# Patient Record
Sex: FEMALE | Race: WHITE | NOT HISPANIC OR LATINO
[De-identification: names, ages, dates, MRNs, and addresses within clinical notes are randomized per-mention and may not be internally consistent; named-entity substitution may affect disease eponyms.]

---

## 2017-01-19 DIAGNOSIS — Z00.00 ENCOUNTER FOR GENERAL ADULT MEDICAL EXAMINATION W/OUT ABNORMAL FINDINGS: ICD-10-CM

## 2017-05-22 ENCOUNTER — APPOINTMENT (OUTPATIENT)
Dept: SURGERY | Facility: CLINIC | Age: 52
End: 2017-05-22

## 2017-09-25 ENCOUNTER — RESULT REVIEW (OUTPATIENT)
Age: 52
End: 2017-09-25

## 2024-03-06 PROBLEM — Z13.29 SCREENING FOR ENDOCRINE, NUTRITIONAL, METABOLIC AND IMMUNITY DISORDER: Status: ACTIVE | Noted: 2024-03-06

## 2024-03-14 ENCOUNTER — NON-APPOINTMENT (OUTPATIENT)
Age: 59
End: 2024-03-14

## 2024-03-14 ENCOUNTER — APPOINTMENT (OUTPATIENT)
Dept: SURGERY | Facility: CLINIC | Age: 59
End: 2024-03-14
Payer: COMMERCIAL

## 2024-03-14 VITALS
RESPIRATION RATE: 17 BRPM | HEART RATE: 69 BPM | SYSTOLIC BLOOD PRESSURE: 127 MMHG | DIASTOLIC BLOOD PRESSURE: 77 MMHG | HEIGHT: 62 IN | WEIGHT: 192 LBS | TEMPERATURE: 97.6 F | BODY MASS INDEX: 35.33 KG/M2 | OXYGEN SATURATION: 97 %

## 2024-03-14 DIAGNOSIS — Z13.21 ENCOUNTER FOR SCREENING FOR OTHER SUSPECTED ENDOCRINE DISORDER: ICD-10-CM

## 2024-03-14 DIAGNOSIS — Z13.29 ENCOUNTER FOR SCREENING FOR OTHER SUSPECTED ENDOCRINE DISORDER: ICD-10-CM

## 2024-03-14 DIAGNOSIS — Z13.228 ENCOUNTER FOR SCREENING FOR OTHER SUSPECTED ENDOCRINE DISORDER: ICD-10-CM

## 2024-03-14 DIAGNOSIS — Z13.0 ENCOUNTER FOR SCREENING FOR OTHER SUSPECTED ENDOCRINE DISORDER: ICD-10-CM

## 2024-03-14 DIAGNOSIS — R63.5 ABNORMAL WEIGHT GAIN: ICD-10-CM

## 2024-03-14 DIAGNOSIS — E66.01 MORBID (SEVERE) OBESITY DUE TO EXCESS CALORIES: ICD-10-CM

## 2024-03-14 PROCEDURE — 99203 OFFICE O/P NEW LOW 30 MIN: CPT

## 2024-03-14 RX ORDER — ESCITALOPRAM OXALATE 10 MG/1
10 TABLET, FILM COATED ORAL
Refills: 0 | Status: ACTIVE | COMMUNITY

## 2024-03-14 NOTE — ASSESSMENT
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

## 2024-03-14 NOTE — PLAN
[FreeTextEntry1] : Plan: Resume high-protein low-carb diet; 600 kcal 60 to 80 g of protein a day less than 25 g of carbohydrates per day. Dietary counseling provided Consulted with practice NP regarding possible weight loss medication Increase activities as tolerated Check blood work; prescription given vitamins daily Follow-up 3 months

## 2024-03-14 NOTE — HISTORY OF PRESENT ILLNESS
[de-identified] : Patient wants has gained weight since last visit - gets hungry often. Highest weight is 278 lbs (before sleeve sx). Lowest weight was around 154 lbs. Has tried to lose weight through diet and walking/trend mill. Is currently the heaviest shes been since after sx. Sometimes has acid reflux - takes Nexium PRN. Denies shortness of breath with exertion, sleep apnea, weight bearing joint pain, or lower back pain. Denies hypertension, hypercholesteremia, and diabetes. Not taking anticoagulants. [facility] abdominal sx: sleeve, appendectomy, and .  [de-identified] : S/p gastric sleeve in 2014, lost follow up. Patient here to reestablish care due to weight gain

## 2024-03-22 LAB
25(OH)D3 SERPL-MCNC: 24 NG/ML
ALBUMIN SERPL ELPH-MCNC: 4.3 G/DL
ALP BLD-CCNC: 80 U/L
ALT SERPL-CCNC: 14 U/L
AMYLASE/CREAT SERPL: 59 U/L
ANION GAP SERPL CALC-SCNC: 14 MMOL/L
AST SERPL-CCNC: 18 U/L
BASOPHILS # BLD AUTO: 0.03 K/UL
BASOPHILS NFR BLD AUTO: 0.5 %
BILIRUB SERPL-MCNC: 0.3 MG/DL
BUN SERPL-MCNC: 13 MG/DL
CALCIUM SERPL-MCNC: 9.3 MG/DL
CHLORIDE SERPL-SCNC: 101 MMOL/L
CHOLEST SERPL-MCNC: 187 MG/DL
CO2 SERPL-SCNC: 23 MMOL/L
CREAT SERPL-MCNC: 0.63 MG/DL
EGFR: 102 ML/MIN/1.73M2
EOSINOPHIL # BLD AUTO: 0.02 K/UL
EOSINOPHIL NFR BLD AUTO: 0.3 %
ESTIMATED AVERAGE GLUCOSE: 117 MG/DL
FOLATE SERPL-MCNC: 11.7 NG/ML
GLUCOSE SERPL-MCNC: 84 MG/DL
HBA1C MFR BLD HPLC: 5.7 %
HCT VFR BLD CALC: 36.5 %
HDLC SERPL-MCNC: 41 MG/DL
HGB BLD-MCNC: 11.9 G/DL
IMM GRANULOCYTES NFR BLD AUTO: 0.2 %
LDLC SERPL CALC-MCNC: 127 MG/DL
LPL SERPL-CCNC: 24 U/L
LYMPHOCYTES # BLD AUTO: 0.92 K/UL
LYMPHOCYTES NFR BLD AUTO: 16.1 %
MAGNESIUM SERPL-MCNC: 1.9 MG/DL
MAN DIFF?: NORMAL
MCHC RBC-ENTMCNC: 28.1 PG
MCHC RBC-ENTMCNC: 32.6 GM/DL
MCV RBC AUTO: 86.3 FL
MONOCYTES # BLD AUTO: 0.47 K/UL
MONOCYTES NFR BLD AUTO: 8.2 %
NEUTROPHILS # BLD AUTO: 4.28 K/UL
NEUTROPHILS NFR BLD AUTO: 74.7 %
NONHDLC SERPL-MCNC: 147 MG/DL
PHOSPHATE SERPL-MCNC: 4.3 MG/DL
PLATELET # BLD AUTO: 387 K/UL
POTASSIUM SERPL-SCNC: 3.8 MMOL/L
PROT SERPL-MCNC: 6.8 G/DL
RBC # BLD: 4.23 M/UL
RBC # FLD: 14.3 %
SODIUM SERPL-SCNC: 138 MMOL/L
TRIGL SERPL-MCNC: 106 MG/DL
TSH SERPL-ACNC: 2.41 UIU/ML
VIT B12 SERPL-MCNC: 341 PG/ML
WBC # FLD AUTO: 5.73 K/UL

## 2024-03-26 ENCOUNTER — APPOINTMENT (OUTPATIENT)
Dept: SURGERY | Facility: CLINIC | Age: 59
End: 2024-03-26
Payer: COMMERCIAL

## 2024-03-26 VITALS — BODY MASS INDEX: 35.33 KG/M2 | HEIGHT: 62 IN | WEIGHT: 192 LBS

## 2024-03-26 PROCEDURE — 99204 OFFICE O/P NEW MOD 45 MIN: CPT

## 2024-03-26 NOTE — PHYSICAL EXAM
[Obese] : obese [Normal] : grossly intact [de-identified] : Current BMI 35.1.  Limited due to telehealth visit.

## 2024-03-26 NOTE — ASSESSMENT
[FreeTextEntry1] : Patient is status post sleeve gastrectomy in 2014 with 120 pound weight loss.  She has regained 40 pounds over the last several years.  This weight gain has been refractory to diet and exercise efforts.  I will prescribe patient 37.5 mg of phentermine.  She will start by taking 1/2 tablet daily.  We discussed side effects risks and benefits.  Patient wishes to proceed.  I will see her in 2 months for follow-up.  I have discussed with the patient options for medical weight management and will initiate phentermine.  I have discussed possible side effects including but not limited to palpitations, insomnia, nervousness, increased anxiety, elevated blood pressure, dry mouth, and constipation. Pt. verbalizes understanding and would like to continue with planned treatment. Jessica does not have any cardiac conditions to phentermine such as valve replacements or uncontrolled hypertension, previous cardia stents or myocardial infarction.

## 2024-03-26 NOTE — HISTORY OF PRESENT ILLNESS
[Home] : at home, [unfilled] , at the time of the visit. [Medical Office: ([facility])___] : at the medical office located in  [Verbal consent obtained from patient] : the patient, [unfilled] [de-identified] : Patient has gained weight since last visit - gets hungry often. Highest weight is 278 lbs (before sleeve sx). Lowest weight was around 154 lbs. Has tried to lose weight through diet and walking/trend mill. Is currently the heaviest shes been since after sx. Sometimes has acid reflux - takes Nexium PRN. Denies shortness of breath with exertion, sleep apnea, weight bearing joint pain, or lower back pain. Denies hypertension, hypercholesteremia, and diabetes. Not taking anticoagulants. [facility] abdominal sx: sleeve, appendectomy, and .   Procedure Details: Procedure performed: Laparoscopic sleeve gastrectomy , Date of Surgery: 2014, Surgeon Name: Dr. Avery . Pre-op weight was 236 lbs.   Post Operative: 10 years. S/p gastric sleeve in , lost follow up. Patient here to reestablish care due to weight gain. [de-identified] : Patient is status post sleeve gastrectomy in 2014.  Initial weight 270 pounds and lowest weight 152 pounds.  Her current weight is 192 pounds.  She reports gaining this 40 pounds over the last several years slowly.  She feels as if her appetite has significantly increased as has her portion sizes.  We discussed several different obesity medicine options and patient agrees at the current time due to shortages of all GLP-1 injections we will try low-dose phentermine.  We discussed side effects, risks and benefits of phentermine.  Patient wishes to proceed with plan.  She denies any history of hypertension, stents or cardiac history.  Patient has a home blood pressure monitor and will monitor her blood pressure daily and report any readings above 130/90.  Patient verbalized understanding.  I will prescribe 37.5 mg and have patient break tablet in half for the first 2 weeks.  If patient needs a higher dose she will increase to the full 37.5 mg as tolerated.

## 2024-03-27 LAB — VIT B1 SERPL-MCNC: 99.7 NMOL/L

## 2024-05-28 ENCOUNTER — APPOINTMENT (OUTPATIENT)
Dept: SURGERY | Facility: CLINIC | Age: 59
End: 2024-05-28
Payer: COMMERCIAL

## 2024-05-28 VITALS — HEIGHT: 62 IN | BODY MASS INDEX: 31.83 KG/M2 | WEIGHT: 173 LBS

## 2024-05-28 PROCEDURE — 99213 OFFICE O/P EST LOW 20 MIN: CPT

## 2024-05-28 RX ORDER — PHENTERMINE HYDROCHLORIDE 37.5 MG/1
37.5 TABLET ORAL
Qty: 30 | Refills: 1 | Status: ACTIVE | COMMUNITY
Start: 2024-03-26 | End: 1900-01-01

## 2024-05-28 NOTE — PHYSICAL EXAM
[Obese] : obese [Normal] : affect appropriate [de-identified] : Current BMI 31.6.  Limited due to telehealth visit.

## 2024-05-28 NOTE — HISTORY OF PRESENT ILLNESS
[Home] : at home, [unfilled] , at the time of the visit. [Medical Office: ([facility])___] : at the medical office located in  [Verbal consent obtained from patient] : the patient, [unfilled] [de-identified] : Procedure Details: Procedure performed: Laparoscopic sleeve gastrectomy , Date of Surgery: 1/22/2014, Surgeon Name: Dr. Avery. Pre-op weight was 236 lbs. Post Operative: 10 years. S/p gastric sleeve in 2014, lost follow up. Patient here to reestablish care due to weight gain.   Interval History: Patient is status post sleeve gastrectomy in 2014. Initial weight 270 pounds and lowest weight 152 pounds. Her current weight is 192 pounds. She reports gaining this 40 pounds over the last several years slowly. She feels as if her appetite has significantly increased as has her portion sizes.  Was prescribed phentermine at previous visit and presents for f/u today.   [de-identified] : Patient is down almost 20 pounds since last being seen 2 months ago while starting phentermine.  Patient currently tolerating 37.5 mg with no reported adverse effects.  Denies hypertension, tachycardia insomnia or constipation.  Is very happy with her results.  Also reports an increase in energy.  She has used his energy to increase her physical activity and I suggested at least 150 minutes/week of exercise.  Patient reports significant decrease in appetite and is working on a caloric reduction to no more than 1200 to 1300 laquita/day.  Will refill phentermine for 2 more months and I will see her for a follow-up then.  I instructed patient to contact me if she has any questions or concerns.  I also mention to patient that she may develop decrease in weight loss as she develops resistance to this medication over time.  Patient verbalized understanding of this.

## 2024-05-28 NOTE — ASSESSMENT
[FreeTextEntry1] : In summary Jessica is a pleasant 58-year-old female that presents for obesity medicine follow-up today.  She was prescribed phentermine 2 months ago at initial visit and doing quite well.  She has lost 20 pounds and happy with her results.  She has reported increasing her activities and noticeably having a decrease in appetite and cravings.  Continues to work on caloric reduction as discussed in today's visit.  I will see her in 2 months for follow-up.  Patient agrees with current plan.

## 2024-06-13 ENCOUNTER — APPOINTMENT (OUTPATIENT)
Dept: SURGERY | Facility: CLINIC | Age: 59
End: 2024-06-13

## 2024-07-22 ENCOUNTER — APPOINTMENT (OUTPATIENT)
Dept: ORTHOPEDIC SURGERY | Facility: CLINIC | Age: 59
End: 2024-07-22
Payer: COMMERCIAL

## 2024-07-22 DIAGNOSIS — M65.9 SYNOVITIS AND TENOSYNOVITIS, UNSPECIFIED: ICD-10-CM

## 2024-07-22 PROCEDURE — 99203 OFFICE O/P NEW LOW 30 MIN: CPT

## 2024-07-22 NOTE — PHYSICAL EXAM
[Mild] : mild diffused ankle swelling [NL (40)] : plantar flexion 40 degrees [NL 30)] : inversion 30 degrees [5___] : eversion 5[unfilled]/5 [2+] : posterior tibialis pulse: 2+ [Normal] : saphenous nerve sensation normal [Left] : left ankle [Weight -] : weightbearing [] : non-antalgic [FreeTextEntry9] : Plantar heel spur, lucency medial talar dome and distal tibia.  [TWNoteComboBox7] : dorsiflexion 15 degrees [de-identified] : eversion 15 degrees

## 2024-07-22 NOTE — PHYSICAL EXAM
[Mild] : mild diffused ankle swelling [NL (40)] : plantar flexion 40 degrees [NL 30)] : inversion 30 degrees [5___] : eversion 5[unfilled]/5 [2+] : posterior tibialis pulse: 2+ [Normal] : saphenous nerve sensation normal [Left] : left ankle [Weight -] : weightbearing [] : non-antalgic [FreeTextEntry9] : Plantar heel spur, lucency medial talar dome and distal tibia.  [TWNoteComboBox7] : dorsiflexion 15 degrees [de-identified] : eversion 15 degrees

## 2024-07-22 NOTE — DISCUSSION/SUMMARY
[de-identified] : Due to the chronicity and xray findings, recommend MRI LT ankle to evaluate for medial talar osteochondral injury.  WB in supportive footwear. Tylenol prn (history gastric sleeve). Ice to affected area. Avoid high impact activities.

## 2024-07-22 NOTE — PHYSICAL EXAM
[Mild] : mild diffused ankle swelling [NL (40)] : plantar flexion 40 degrees [NL 30)] : inversion 30 degrees [5___] : eversion 5[unfilled]/5 [2+] : posterior tibialis pulse: 2+ [Normal] : saphenous nerve sensation normal [Left] : left ankle [Weight -] : weightbearing [] : non-antalgic [FreeTextEntry9] : Plantar heel spur, lucency medial talar dome and distal tibia.  [TWNoteComboBox7] : dorsiflexion 15 degrees [de-identified] : eversion 15 degrees

## 2024-07-22 NOTE — DISCUSSION/SUMMARY
[de-identified] : Due to the chronicity and xray findings, recommend MRI LT ankle to evaluate for medial talar osteochondral injury.  WB in supportive footwear. Tylenol prn (history gastric sleeve). Ice to affected area. Avoid high impact activities.

## 2024-07-22 NOTE — DISCUSSION/SUMMARY
[de-identified] : Due to the chronicity and xray findings, recommend MRI LT ankle to evaluate for medial talar osteochondral injury.  WB in supportive footwear. Tylenol prn (history gastric sleeve). Ice to affected area. Avoid high impact activities.

## 2024-07-25 NOTE — HISTORY OF PRESENT ILLNESS
[de-identified] : NATALIYA DONATO a 58 year old female here for evaluation of her. Patient states she Pain localized along the lateral medial dorsal plantar Had XR taken which showed Denies trauma/previous injury. No numbness/tingling. Ice to affected area. No formal treatment to date. WB in

## 2024-07-25 NOTE — HISTORY OF PRESENT ILLNESS
[de-identified] : NATALIYA DONATO a 58 year old female here for evaluation of her. Patient states she Pain localized along the lateral medial dorsal plantar Had XR taken which showed Denies trauma/previous injury. No numbness/tingling. Ice to affected area. No formal treatment to date. WB in

## 2024-07-25 NOTE — HISTORY OF PRESENT ILLNESS
[de-identified] : NATALIYA DONATO a 58 year old female here for evaluation of her. Patient states she Pain localized along the lateral medial dorsal plantar Had XR taken which showed Denies trauma/previous injury. No numbness/tingling. Ice to affected area. No formal treatment to date. WB in

## 2024-07-31 ENCOUNTER — APPOINTMENT (OUTPATIENT)
Dept: MRI IMAGING | Facility: CLINIC | Age: 59
End: 2024-07-31
Payer: COMMERCIAL

## 2024-07-31 PROCEDURE — 73721 MRI JNT OF LWR EXTRE W/O DYE: CPT | Mod: LT

## 2024-08-05 ENCOUNTER — APPOINTMENT (OUTPATIENT)
Dept: ORTHOPEDIC SURGERY | Facility: CLINIC | Age: 59
End: 2024-08-05

## 2024-08-05 PROBLEM — M95.8 OSTEOCHONDRAL DEFECT OF TALUS: Status: ACTIVE | Noted: 2024-08-05

## 2024-08-05 PROBLEM — M19.072 PRIMARY OSTEOARTHRITIS OF LEFT ANKLE: Status: ACTIVE | Noted: 2024-08-05

## 2024-08-05 PROBLEM — M95.8: Status: ACTIVE | Noted: 2024-08-05

## 2024-08-05 PROCEDURE — 99213 OFFICE O/P EST LOW 20 MIN: CPT

## 2024-08-05 PROCEDURE — 99214 OFFICE O/P EST MOD 30 MIN: CPT

## 2024-08-05 NOTE — DISCUSSION/SUMMARY
[de-identified] : Patient has no real symptoms today. She has celebrex at home and she can take it daily as needed. The patient was explained the options as well as benefits of over the counter verses prescription strength nonsteroidal anti-inflammatory medication. Prescription strength medication is recommended to suppress chronic inflammation. The patient opts for a prescription strength medication. If symptoms worsen, steroid injection would be another option. She will return if symptoms worsen again.

## 2024-08-05 NOTE — PHYSICAL EXAM
[Mild] : mild diffused ankle swelling [NL (40)] : plantar flexion 40 degrees [NL 30)] : inversion 30 degrees [5___] : eversion 5[unfilled]/5 [2+] : posterior tibialis pulse: 2+ [Normal] : saphenous nerve sensation normal [Left] : left ankle [Weight -] : weightbearing [FreeTextEntry9] : Plantar heel spur, lucency medial talar dome and distal tibia.  [NL (20)] : eversion 20 degrees [] : non-antalgic [TWNoteComboBox7] : dorsiflexion 15 degrees

## 2024-08-05 NOTE — HISTORY OF PRESENT ILLNESS
[de-identified] : Patient returns for her left ankle.  She reports intermittent mild pain since March, 2024.  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  She states her pain is not bad at this time.
yes

## 2024-08-14 ENCOUNTER — APPOINTMENT (OUTPATIENT)
Dept: ORTHOPEDIC SURGERY | Facility: CLINIC | Age: 59
End: 2024-08-14

## 2024-08-29 ENCOUNTER — APPOINTMENT (OUTPATIENT)
Dept: SURGERY | Facility: CLINIC | Age: 59
End: 2024-08-29
Payer: COMMERCIAL

## 2024-08-29 VITALS — BODY MASS INDEX: 30.73 KG/M2 | WEIGHT: 167 LBS | HEIGHT: 62 IN

## 2024-08-29 PROCEDURE — 99213 OFFICE O/P EST LOW 20 MIN: CPT

## 2024-08-29 RX ORDER — TOPIRAMATE 25 MG/1
25 TABLET, FILM COATED ORAL
Qty: 60 | Refills: 1 | Status: ACTIVE | COMMUNITY
Start: 2024-08-29 | End: 1900-01-01

## 2024-08-29 NOTE — PHYSICAL EXAM
[Obese] : obese [Normal] : affect appropriate [de-identified] : Current BMI 30.54.  Limited due to telehealth visit.

## 2024-08-29 NOTE — PHYSICAL EXAM
[Obese] : obese [Normal] : affect appropriate [de-identified] : Current BMI 30.54.  Limited due to telehealth visit.

## 2024-08-29 NOTE — ASSESSMENT
[FreeTextEntry1] : In summary Jessica is a pleasant 59-year-old female status post sleeve gastrectomy in 2014.  Has been seeing myself for weight regain and was prescribed phentermine.  She currently tolerates 37.5 mg daily with no reported adverse side effects however believes her weight loss is slow and her cravings have increased over the last several weeks.  After long discussion we decided adding topiramate 25 to 50 mg nightly in addition to phentermine once daily.  Phentermine was refilled at this visit as well.  We reviewed risks, benefits and side effects including somnolence fatigue and fogginess of the topiramate however patient wishes to proceed with plan.  Patient also reports occasional migraines in which Topamax may lessen.   I will see her in 2 months for follow-up and she will contact the office if she has any concerns or questions prior to her upcoming visit.  Patient agrees with plan.

## 2024-08-29 NOTE — HISTORY OF PRESENT ILLNESS
[Home] : at home, [unfilled] , at the time of the visit. [Medical Office: ([facility])___] : at the medical office located in  [Verbal consent obtained from patient] : the patient, [unfilled] [de-identified] : Procedure Details: Procedure performed: Laparoscopic sleeve gastrectomy , Date of Surgery: 1/22/2014, Surgeon Name: Dr. Avery. Pre-op weight was 236 lbs. Post Operative: 10 years. S/p gastric sleeve in 2014, lost follow up. Patient here to reestablish care due to weight gain.  Interval History: Patient is status post sleeve gastrectomy in 2014. Initial weight 270 pounds and lowest weight 152 pounds. Her current weight is 192 pounds. She reports gaining this 40 pounds over the last several years slowly. She feels as if her appetite has significantly increased as has her portion sizes.  Was prescribed phentermine at previous visit and presents for f/u today. Patient down 6 pounds since last being seen while taking phentermine.  She reports initial weight loss and decrease in cravings however believes her appetite is increasing again.  Would like to discuss alternative or additional options.  I recommended adding topiramate 25 to 50 mg nightly in conjunction with phentermine as this may synergistically assist patient with cravings and overall appetite.  We reviewed risks, benefits and side effects and patient wishes to proceed with plan.

## 2024-08-29 NOTE — HISTORY OF PRESENT ILLNESS
[Home] : at home, [unfilled] , at the time of the visit. [Medical Office: ([facility])___] : at the medical office located in  [Verbal consent obtained from patient] : the patient, [unfilled] [de-identified] : Procedure Details: Procedure performed: Laparoscopic sleeve gastrectomy , Date of Surgery: 1/22/2014, Surgeon Name: Dr. Avery. Pre-op weight was 236 lbs. Post Operative: 10 years. S/p gastric sleeve in 2014, lost follow up. Patient here to reestablish care due to weight gain.  Interval History: Patient is status post sleeve gastrectomy in 2014. Initial weight 270 pounds and lowest weight 152 pounds. Her current weight is 192 pounds. She reports gaining this 40 pounds over the last several years slowly. She feels as if her appetite has significantly increased as has her portion sizes.  Was prescribed phentermine at previous visit and presents for f/u today. Patient down 6 pounds since last being seen while taking phentermine.  She reports initial weight loss and decrease in cravings however believes her appetite is increasing again.  Would like to discuss alternative or additional options.  I recommended adding topiramate 25 to 50 mg nightly in conjunction with phentermine as this may synergistically assist patient with cravings and overall appetite.  We reviewed risks, benefits and side effects and patient wishes to proceed with plan.

## 2024-10-22 ENCOUNTER — APPOINTMENT (OUTPATIENT)
Dept: SURGERY | Facility: CLINIC | Age: 59
End: 2024-10-22

## 2024-11-12 ENCOUNTER — APPOINTMENT (OUTPATIENT)
Dept: SURGERY | Facility: CLINIC | Age: 59
End: 2024-11-12
Payer: COMMERCIAL

## 2024-11-12 PROCEDURE — 97802 MEDICAL NUTRITION INDIV IN: CPT | Mod: 95

## 2024-12-03 ENCOUNTER — APPOINTMENT (OUTPATIENT)
Dept: SURGERY | Facility: CLINIC | Age: 59
End: 2024-12-03

## 2025-03-11 ENCOUNTER — APPOINTMENT (OUTPATIENT)
Dept: SURGERY | Facility: CLINIC | Age: 60
End: 2025-03-11

## 2025-07-01 ENCOUNTER — APPOINTMENT (OUTPATIENT)
Dept: SURGERY | Facility: CLINIC | Age: 60
End: 2025-07-01

## 2025-07-01 VITALS — WEIGHT: 187 LBS | BODY MASS INDEX: 34.41 KG/M2 | HEIGHT: 62 IN

## 2025-07-01 PROCEDURE — 99214 OFFICE O/P EST MOD 30 MIN: CPT | Mod: 95

## 2025-07-03 RX ORDER — TIRZEPATIDE 2.5 MG/.5ML
2.5 INJECTION, SOLUTION SUBCUTANEOUS
Qty: 2 | Refills: 0 | Status: ACTIVE | COMMUNITY
Start: 2025-07-01